# Patient Record
Sex: MALE | Race: WHITE | Employment: PART TIME | URBAN - NONMETROPOLITAN AREA
[De-identification: names, ages, dates, MRNs, and addresses within clinical notes are randomized per-mention and may not be internally consistent; named-entity substitution may affect disease eponyms.]

---

## 2023-01-18 ENCOUNTER — HOSPITAL ENCOUNTER (EMERGENCY)
Age: 18
Discharge: HOME | End: 2023-01-18
Payer: COMMERCIAL

## 2023-01-18 VITALS
RESPIRATION RATE: 18 BRPM | DIASTOLIC BLOOD PRESSURE: 66 MMHG | TEMPERATURE: 98.2 F | WEIGHT: 178 LBS | HEART RATE: 92 BPM | OXYGEN SATURATION: 96 % | SYSTOLIC BLOOD PRESSURE: 112 MMHG

## 2023-01-18 DIAGNOSIS — M79.10 MYALGIA: ICD-10-CM

## 2023-01-18 DIAGNOSIS — B34.9 VIRAL SYNDROME: Primary | ICD-10-CM

## 2023-01-18 LAB
INFLUENZA A: NOT DETECTED
INFLUENZA B: NOT DETECTED
SARS-COV-2 RNA, RT PCR: NOT DETECTED

## 2023-01-18 PROCEDURE — 87636 SARSCOV2 & INF A&B AMP PRB: CPT

## 2023-01-18 PROCEDURE — 99283 EMERGENCY DEPT VISIT LOW MDM: CPT

## 2023-01-18 ASSESSMENT — PAIN - FUNCTIONAL ASSESSMENT: PAIN_FUNCTIONAL_ASSESSMENT: 0-10

## 2023-01-18 ASSESSMENT — PAIN DESCRIPTION - LOCATION: LOCATION: HEAD

## 2023-01-18 ASSESSMENT — PAIN DESCRIPTION - DESCRIPTORS: DESCRIPTORS: ACHING

## 2023-01-18 ASSESSMENT — PAIN SCALES - GENERAL: PAINLEVEL_OUTOF10: 9

## 2023-01-18 NOTE — ED TRIAGE NOTES
Patient presents to ED with chief complaint of Fever, headache, body aches. Patient states that symptoms started Monday and have progressively gotten worse. Patient was seen at [facility] this morning, and was diagnosed viral illness. Patient resting in bed. Respirations easy and unlabored. No distress noted. Call light within reach.

## 2023-01-19 NOTE — ED PROVIDER NOTES
[address] [facility] [address] [address] EMERGENCY DEPT      EMERGENCY MEDICINE     Pt Name: David Hickman  MRN: [id number]  [place] 2005  Date of evaluation: 1/18/2023  Provider: CHEYENNE Neri CNP    CHIEF COMPLAINT       Chief Complaint   Patient presents with    Fever    Generalized Body Aches     HISTORY OF PRESENT ILLNESS   David Hickman is a pleasant 21 y.o. male who presents to the emergency department from home with c/o a headache that began two days ago. Patient states the headache feels like increased pressure and also complains of lower back soreness, fevers, and fatigue. He states his temperature was 101 prior to coming to the ED. Patient has a normal appetite and denies any cough, sore throat, or shortness of breath. Patient has not received a flu shot this year, but has had the COVID vaccine. Patient was seen at [facility] this morning and was negative for Strep, COVID, and influenza. Denies neck stiffness. History is obtained from:  patient  PASTMEDICAL HISTORY   No past medical history on file. There is no problem list on file for this patient. SURGICAL HISTORY     No past surgical history on file. CURRENT MEDICATIONS       There are no discharge medications for this patient. ALLERGIES     has No Known Allergies. FAMILY HISTORY     has no family status information on file. SOCIAL HISTORY          PHYSICAL EXAM       ED Triage Vitals   BP Temp Temp src Heart Rate Resp SpO2 Height Weight   01/18/23 1856 01/18/23 1854 -- 01/18/23 1854 01/18/23 1854 01/18/23 1854 -- 01/18/23 1854   112/66 98.2 °F (36.8 °C)  92 18 96 %  178 lb (80.7 kg)       Physical Exam  Vitals and nursing note reviewed. Constitutional:       General: He is not in acute distress. Appearance: He is well-developed. He is ill-appearing. He is not diaphoretic. HENT:      Head: Normocephalic and atraumatic. Mouth/Throat:      Pharynx: Posterior oropharyngeal erythema present.       Tonsils: No tonsillar exudate. 1+ on the right. 1+ on the left. Eyes:      General:         Right eye: No discharge. Left eye: No discharge. Conjunctiva/sclera: Conjunctivae normal.   Neck:      Trachea: No tracheal deviation. Cardiovascular:      Rate and Rhythm: Normal rate and regular rhythm. Heart sounds: Normal heart sounds. No murmur heard. No gallop. Comments: Normal capillary refill  Pulmonary:      Effort: Pulmonary effort is normal. No respiratory distress. Breath sounds: Normal breath sounds. No stridor. Abdominal:      General: Bowel sounds are normal.      Palpations: Abdomen is soft. Musculoskeletal:         General: No tenderness or deformity. Normal range of motion. Cervical back: Normal range of motion and neck supple. No rigidity or tenderness. Skin:     General: Skin is warm and dry. Coloration: Skin is not pale. Findings: No erythema or rash. Neurological:      Mental Status: He is alert and oriented to person, place, and time. Cranial Nerves: No cranial nerve deficit. Psychiatric:         Behavior: Behavior normal.       FORMAL DIAGNOSTIC RESULTS     RADIOLOGY: Interpretation per the Radiologist below, if available at the time of this note (none if blank): No orders to display       LABS: (none if blank)  Labs Reviewed   COVID-19 & INFLUENZA COMBO       (Any cultures that may have been sent were not resulted at the time of this patient visit)    [address] / ED COURSE:     Summary of Patient Presentation:      1) Number and Complexity of Problems            Problem List This Visit:         Chief Complaint   Patient presents with    Fever    Generalized Body Aches            Differential Diagnosis includes (but not limited to):   Upper respiratory infection, viral pharyngitis, Influenza, strep throat, COVID        Diagnoses Considered but I have low suspicion of:   meningitis             Pertinent Comorbid Conditions:        2)  Data Reviewed (none if left blank, additional information can be found in the ED course)          My Independent interpretations:     EKG:          Imaging:     Labs:      negative covid, influenza               Decision Rules/Clinical Scores utilized:                           External Documentation Reviewed:         Previous patient encounter documents & history available on EMR was reviewed              See Formal Diagnostic Results above for the lab and radiology tests and orders. 3)  Treatment and Disposition         ED Reassessment:  stable, improved         Case discussed with consulting clinician/attending physician:           Shared Decision-Making was performed and disposition discussed with the       Patient/Family and questions answered          Social determinants of health impacting treatment or disposition:            Code Status:  Full        MDM  Number of Diagnoses or Management Options  Myalgia: new, no workup  Viral syndrome: new, no workup     Amount and/or Complexity of Data Reviewed  Clinical lab tests: ordered and reviewed  Discussion of test results with the performing providers: no  Decide to obtain previous medical records or to obtain history from someone other than the patient: no  Obtain history from someone other than the patient: no  Review and summarize past medical records: no  Discuss the patient with other providers: no  Independent visualization of images, tracings, or specimens: no    Risk of Complications, Morbidity, and/or Mortality  General comments: Patient was seen in the ER for what appears to be a viral flu like illness. Appropriate labs are ordered and reviewed. The patient appears mildly ill but non-toxic and in NAD. He is given a dose of toradol with improvement. Patient is educated on disease process and advised to stay hydrated, take tylenol and ibuprofen around the clock, follow up closely with pcp and return for new or worsening symptoms.       Patient Progress  Patient progress: improved    Vitals Reviewed:    Vitals:    01/18/23 1854 01/18/23 1856   BP:  112/66   Pulse: 92    Resp: 18    Temp: 98.2 °F (36.8 °C)    SpO2: 96%    Weight: 178 lb (80.7 kg)        The patient was seen and examined. Appropriate diagnostic testing was performed and results reviewed with the patient. The results of pertinent diagnostic studies and exam findings were discussed. The patients provisional diagnosis and plan of care were discussed with the patient and present family who expressed understanding and agreement with the POC. Any medications were reviewed and indications and risks of medications were discussed with the patient /family present. Strict verbal and written return precautions, instructions and appropriate follow-up provided to  the patient. Patient was DISCHARGED from the hospital. Based on the reassuring ED workup and patient's stable vital signs, I feel the patient may be safely discharged home. At this point in time, I believe the patient has the mental capacity to make medical decisions. No notes of EC Admission Criteria type on file. Please note that the patient was evaluated during a pandemic. All efforts were made for HIPPA compliance as well as provision of appropriate care. Patient was seen independently by myself. The patient's final impression and disposition and plan was determined by myself. Strict return precautions and follow up instructions were discussed with the patient prior to discharge, with which the patient agrees. Physical assessment findings, diagnostic testing(s) if applicable, and vital signs reviewed with patient/patient representative. Questions answered. Medications asdirected, including OTC medications for supportive care. Education provided on medications. Differential diagnosis(s) discussed with patient/patient representative.   Home care/self care instructions reviewed withpatient/patient representative. Patient is to follow-up with family care provider in 2-3 days if no improvement. Patient is to go to the emergency department if symptoms worsen. Patient/patient representative isaware of care plan, questions answered, verbalizes understanding and is in agreement. ED Medications administered this visit:  (None if blank)  Medications - No data to display      CONSULTS:  None    PROCEDURES: (None if blank)  Procedures:     CRITICAL CARE: (None if blank)      DISCHARGE PRESCRIPTIONS: (None if blank)  There are no discharge medications for this patient. FINAL IMPRESSION      1. Viral syndrome    2. Myalgia          DISPOSITION/PLAN   DISPOSITION Decision To Discharge 01/18/2023 07:29:23 PM      OUTPATIENT FOLLOW UP THE PATIENT:  [address] [address],[address]  [address] [address] [facility] [address]  Schedule an appointment as soon as possible for a visit in 2 days  For follow up    CHEYENNE Roman CNP, APRN - CNP  01/22/23 0155

## 2023-08-24 ENCOUNTER — HOSPITAL ENCOUNTER (EMERGENCY)
Age: 18
Discharge: HOME | End: 2023-08-24
Payer: COMMERCIAL

## 2023-08-24 VITALS
HEART RATE: 85 BPM | BODY MASS INDEX: 30.31 KG/M2 | OXYGEN SATURATION: 96 % | SYSTOLIC BLOOD PRESSURE: 119 MMHG | DIASTOLIC BLOOD PRESSURE: 82 MMHG | RESPIRATION RATE: 16 BRPM | WEIGHT: 200 LBS | HEIGHT: 68 IN | TEMPERATURE: 98.1 F

## 2023-08-24 DIAGNOSIS — K52.9 GASTROENTERITIS: Primary | ICD-10-CM

## 2023-08-24 PROCEDURE — 99203 OFFICE O/P NEW LOW 30 MIN: CPT | Performed by: NURSE PRACTITIONER

## 2023-08-24 PROCEDURE — 99213 OFFICE O/P EST LOW 20 MIN: CPT

## 2023-08-24 RX ORDER — ONDANSETRON 4 MG/1
4 TABLET, ORALLY DISINTEGRATING ORAL 3 TIMES DAILY PRN
Qty: 21 TABLET | Refills: 0 | Status: SHIPPED | OUTPATIENT
Start: 2023-08-24

## 2023-08-24 ASSESSMENT — ENCOUNTER SYMPTOMS
CHEST TIGHTNESS: 0
DIARRHEA: 1
CHOKING: 0
SHORTNESS OF BREATH: 0
VOMITING: 0
WHEEZING: 0
ABDOMINAL PAIN: 0
STRIDOR: 0
APNEA: 0
COUGH: 0
RECENT COUGH: 0

## 2023-08-24 ASSESSMENT — PAIN - FUNCTIONAL ASSESSMENT: PAIN_FUNCTIONAL_ASSESSMENT: NONE - DENIES PAIN

## 2023-08-24 NOTE — ED NOTES
Pt presents to [facility] for c/o diarrhea x 2 days. Pt denies fevers, abdominal pain,  nausea or vomiting.       Evi Osei, ZAIDA  98/63/03 8505